# Patient Record
Sex: FEMALE | Race: WHITE | Employment: OTHER | RURAL
[De-identification: names, ages, dates, MRNs, and addresses within clinical notes are randomized per-mention and may not be internally consistent; named-entity substitution may affect disease eponyms.]

---

## 2017-01-26 ENCOUNTER — OFFICE VISIT (OUTPATIENT)
Dept: SURGERY | Age: 67
End: 2017-01-26

## 2017-01-26 VITALS
HEIGHT: 68 IN | HEART RATE: 85 BPM | BODY MASS INDEX: 35.16 KG/M2 | WEIGHT: 232 LBS | DIASTOLIC BLOOD PRESSURE: 83 MMHG | SYSTOLIC BLOOD PRESSURE: 143 MMHG

## 2017-01-26 DIAGNOSIS — S81.801S: Primary | ICD-10-CM

## 2017-01-26 NOTE — PROGRESS NOTES
[id number] [facility], [facility] [place]  Phone: [phone] Fax: [phone]                                              OFFICE NOTE    NAME: Maldonado Álvarez  : 1950    Chief Complaint:   Slow healing wound right leg    History: Maldonado Álvarez is a 74 y.o. female who sustained a significant injury to her right leg in October. She had significant skin loss at the time and has been treating the wound with wet to dry Dakin's. It is healing but very slowly about 1 cm a month. We feel that an acellular matrix application will help speed up the healing. Past Medical History   Diagnosis Date    Depression     IBS (irritable bowel syndrome)      Past Surgical History   Procedure Laterality Date    Hx knee replacement Right     Hx colonoscopy          Current Outpatient Prescriptions   Medication Sig Dispense Refill    sodium hypochlorite (DAKIN'S SOLUTION) external solution Apply to gauze and then cover wound daily. Indications: SKIN DISINFECTION 1 Bottle 3    silver sulfADIAZINE (SILVADENE) 1 % topical cream Apply  to affected area daily. 85 g 2     No Known Allergies  Social History     Social History    Marital status:      Spouse name: N/A    Number of children: N/A    Years of education: N/A     Occupational History    Not on file. Social History Main Topics    Smoking status: Never Smoker    Smokeless tobacco: Not on file    Alcohol use Yes    Drug use: Not on file    Sexual activity: Not on file     Other Topics Concern    Not on file     Social History Narrative     Family History   Problem Relation Age of Onset    Cancer Mother      breast       There is no problem list on file for this patient. Review of Systems:  Review of Systems   Constitutional: Negative for chills, fever, malaise/fatigue and weight loss. HENT: Negative for congestion, ear discharge, ear pain, hearing loss, nosebleeds, sore throat and tinnitus.     Eyes: Negative for blurred vision, double vision, pain, discharge and redness. Respiratory: Negative for cough, sputum production, shortness of breath and wheezing. Cardiovascular: Negative for chest pain, palpitations and leg swelling. Gastrointestinal: Positive for abdominal pain. Negative for blood in stool, constipation, diarrhea, heartburn, melena, nausea and vomiting. Genitourinary: Negative for frequency, hematuria and urgency. Musculoskeletal: Negative for back pain, joint pain and neck pain. Skin: Negative for itching and rash. Neurological: Negative for dizziness, tremors, focal weakness, seizures, weakness and headaches. Endo/Heme/Allergies: Does not bruise/bleed easily. Psychiatric/Behavioral: Positive for depression. Negative for memory loss. The patient is not nervous/anxious and does not have insomnia. Physical Exam:  Visit Vitals    /83 (BP 1 Location: Left arm, BP Patient Position: Sitting)    Pulse 85    Ht 5' 8\" (1.727 m)    Wt 232 lb (105.2 kg)    BMI 35.28 kg/m2       Physical Exam   Constitutional: She is oriented to person, place, and time. She appears well-developed and well-nourished. HENT:   Head: Normocephalic and atraumatic. Right Ear: External ear normal.   Left Ear: External ear normal.   Nose: Nose normal.   Mouth/Throat: Oropharynx is clear and moist. No oropharyngeal exudate. Eyes: Conjunctivae and EOM are normal. Pupils are equal, round, and reactive to light. Right eye exhibits no discharge. Left eye exhibits no discharge. No scleral icterus. Neck: Neck supple. No JVD present. No tracheal deviation present. No thyromegaly present. Cardiovascular: Normal rate, regular rhythm and normal heart sounds. Exam reveals no gallop and no friction rub. No murmur heard. Pulmonary/Chest: Effort normal and breath sounds normal. She has no wheezes. She has no rales. Abdominal: Soft. Bowel sounds are normal. She exhibits no distension and no mass. There is no tenderness. There is no rebound. Musculoskeletal: Normal range of motion. Right lateral leg with a healing, granulating wound:  4.3 x 3.7   A little leg edema noted   Lymphadenopathy:     She has no cervical adenopathy. Neurological: She is alert and oriented to person, place, and time. Skin: Skin is warm and dry. No rash noted. No erythema. Psychiatric: She has a normal mood and affect. Her behavior is normal. Judgment and thought content normal.       Impression:  Slow healing right leg wound; has healed 1.1 x 1.3 cm in one month  Options are STSG vs acellular matrix. Plan:  Placement of Acellular matrix under local with IV sedation on 1/79/53  Risks and complications were explained to patient and they voiced understanding.     Makenzie Parr M.D.

## 2017-02-23 ENCOUNTER — OFFICE VISIT (OUTPATIENT)
Dept: SURGERY | Age: 67
End: 2017-02-23

## 2017-02-23 VITALS
HEIGHT: 68 IN | HEART RATE: 80 BPM | SYSTOLIC BLOOD PRESSURE: 134 MMHG | DIASTOLIC BLOOD PRESSURE: 88 MMHG | BODY MASS INDEX: 35.16 KG/M2 | WEIGHT: 232 LBS

## 2017-02-23 DIAGNOSIS — S81.801S: Primary | ICD-10-CM

## 2017-02-23 NOTE — PROGRESS NOTES
No c/o    EXAM:  Adaptic a little dry but in place  Staples remoed and adaptic removed  Acell incorporated  Skin edges growing in  Redressed with acell matrix and adaptic    Change dresing every other day and apply solosite.     RTO 2 weeks

## 2017-03-09 ENCOUNTER — OFFICE VISIT (OUTPATIENT)
Dept: SURGERY | Age: 67
End: 2017-03-09

## 2017-03-09 VITALS
SYSTOLIC BLOOD PRESSURE: 116 MMHG | BODY MASS INDEX: 35.16 KG/M2 | DIASTOLIC BLOOD PRESSURE: 75 MMHG | WEIGHT: 232 LBS | HEART RATE: 86 BPM | HEIGHT: 68 IN

## 2017-03-09 DIAGNOSIS — S81.801S: ICD-10-CM

## 2017-03-09 DIAGNOSIS — L97.911 LEG ULCER, RIGHT, LIMITED TO BREAKDOWN OF SKIN (HCC): Primary | ICD-10-CM

## 2017-03-09 NOTE — PROGRESS NOTES
No c/o  Had telfa on area  Some slimy drainage on dressing    EXAM;  Ulcer clean with good granulating base:  13 x 10 mm  Bed prepared and new piece of acellular matrix applied. Covered with adaptic and solosite and then steri-stripped in place. The dressed with solosite and gauze and wrap.     RTO 2 weeks

## 2017-03-23 ENCOUNTER — OFFICE VISIT (OUTPATIENT)
Dept: SURGERY | Age: 67
End: 2017-03-23

## 2017-03-23 VITALS
WEIGHT: 232 LBS | DIASTOLIC BLOOD PRESSURE: 72 MMHG | HEIGHT: 68 IN | SYSTOLIC BLOOD PRESSURE: 112 MMHG | HEART RATE: 77 BPM | BODY MASS INDEX: 35.16 KG/M2

## 2017-03-23 DIAGNOSIS — S81.801S: Primary | ICD-10-CM

## 2017-04-06 ENCOUNTER — OFFICE VISIT (OUTPATIENT)
Dept: SURGERY | Age: 67
End: 2017-04-06

## 2017-04-06 VITALS
DIASTOLIC BLOOD PRESSURE: 90 MMHG | HEART RATE: 86 BPM | BODY MASS INDEX: 35.16 KG/M2 | HEIGHT: 68 IN | SYSTOLIC BLOOD PRESSURE: 150 MMHG | WEIGHT: 232 LBS

## 2017-04-06 DIAGNOSIS — S81.801S: Primary | ICD-10-CM

## 2017-04-06 NOTE — PROGRESS NOTES
No c/o  Still dressing it with solosite    EXAM:  Visit Vitals    /90 (BP 1 Location: Right arm, BP Patient Position: Sitting)    Pulse 86    Ht 5' 8\" (1.727 m)    Wt 105.2 kg (232 lb)    BMI 35.28 kg/m2       8 mm central thickness with 3 mm skin gap  Rest of scar better  Redressed with solosite and bandaid    RTO 2 months to check scarring

## 2017-06-13 ENCOUNTER — OFFICE VISIT (OUTPATIENT)
Dept: SURGERY | Age: 67
End: 2017-06-13

## 2017-06-13 VITALS
HEART RATE: 92 BPM | BODY MASS INDEX: 35.16 KG/M2 | HEIGHT: 68 IN | WEIGHT: 232 LBS | SYSTOLIC BLOOD PRESSURE: 107 MMHG | DIASTOLIC BLOOD PRESSURE: 67 MMHG

## 2017-06-13 DIAGNOSIS — S81.801S: Primary | ICD-10-CM

## 2017-06-13 NOTE — PROGRESS NOTES
No c/o    EXAM:  Anterior leg scar fading nicely  Lateral is healed but still dry, some drawing    Discussed scar care and timing on healing  Sunblock  Spent over half of 10 minutes in discussion

## 2021-06-09 ENCOUNTER — HOSPITAL ENCOUNTER (OUTPATIENT)
Dept: GENERAL RADIOLOGY | Age: 71
Discharge: HOME | End: 2021-06-09
Attending: NURSE PRACTITIONER
Payer: COMMERCIAL

## 2021-06-09 ENCOUNTER — HOSPITAL ENCOUNTER (OUTPATIENT)
Dept: MAMMOGRAPHY | Age: 71
Discharge: HOME | End: 2021-06-09
Attending: NURSE PRACTITIONER
Payer: COMMERCIAL

## 2021-06-09 DIAGNOSIS — Z78.0 ASYMPTOMATIC MENOPAUSAL STATE: ICD-10-CM

## 2021-06-09 DIAGNOSIS — Z12.31 VISIT FOR SCREENING MAMMOGRAM: ICD-10-CM

## 2021-06-09 PROCEDURE — 77080 DXA BONE DENSITY AXIAL: CPT

## 2021-06-09 PROCEDURE — 77067 SCR MAMMO BI INCL CAD: CPT

## 2022-04-28 NOTE — ADDENDUM NOTE
Addended by: Daphne Lutz on: 2/23/2017 01:29 PM     Modules accepted: Orders
Bed in lowest position, wheels locked, appropriate side rails in place/Call bell, personal items and telephone in reach/Instruct patient to call for assistance before getting out of bed or chair/Non-slip footwear when patient is out of bed/[place] to call system/Physically safe environment - no spills, clutter or unnecessary equipment/Purposeful Proactive Rounding/Room/bathroom lighting operational, light cord in reach

## 2022-08-29 ENCOUNTER — HOSPITAL ENCOUNTER (OUTPATIENT)
Dept: MAMMOGRAPHY | Age: 72
Discharge: HOME | End: 2022-08-29
Attending: INTERNAL MEDICINE
Payer: MEDICARE

## 2022-08-29 VITALS — BODY MASS INDEX: 35.58 KG/M2 | WEIGHT: 234 LBS

## 2022-08-29 DIAGNOSIS — Z12.31 VISIT FOR SCREENING MAMMOGRAM: ICD-10-CM

## 2022-08-29 PROCEDURE — 77063 BREAST TOMOSYNTHESIS BI: CPT

## 2023-04-19 ENCOUNTER — TRANSCRIBE ORDER (OUTPATIENT)
Dept: SCHEDULING | Age: 73
End: 2023-04-19

## 2023-05-23 ENCOUNTER — TRANSCRIBE ORDERS (OUTPATIENT)
Facility: HOSPITAL | Age: 73
End: 2023-05-23

## 2023-05-23 DIAGNOSIS — Z12.31 SCREENING MAMMOGRAM FOR BREAST CANCER: Primary | ICD-10-CM

## 2023-09-06 ENCOUNTER — HOSPITAL ENCOUNTER (OUTPATIENT)
Facility: HOSPITAL | Age: 73
Discharge: HOME | End: 2023-09-09
Payer: COMMERCIAL

## 2023-09-06 VITALS — BODY MASS INDEX: 31.71 KG/M2 | WEIGHT: 202 LBS | HEIGHT: 67 IN

## 2023-09-06 DIAGNOSIS — Z12.31 SCREENING MAMMOGRAM FOR BREAST CANCER: ICD-10-CM

## 2023-09-06 PROCEDURE — 77063 BREAST TOMOSYNTHESIS BI: CPT

## 2023-09-11 ENCOUNTER — TRANSCRIBE ORDERS (OUTPATIENT)
Facility: HOSPITAL | Age: 73
End: 2023-09-11

## 2023-09-11 ENCOUNTER — HOSPITAL ENCOUNTER (OUTPATIENT)
Facility: HOSPITAL | Age: 73
Discharge: HOME | End: 2023-09-14
Payer: COMMERCIAL

## 2023-09-11 DIAGNOSIS — R11.2 NAUSEA AND VOMITING, UNSPECIFIED VOMITING TYPE: ICD-10-CM

## 2023-09-11 DIAGNOSIS — R11.2 NAUSEA AND VOMITING, UNSPECIFIED VOMITING TYPE: Primary | ICD-10-CM

## 2023-09-11 PROCEDURE — 74022 RADEX COMPL AQT ABD SERIES: CPT

## 2024-09-27 ENCOUNTER — TRANSCRIBE ORDERS (OUTPATIENT)
Facility: HOSPITAL | Age: 74
End: 2024-09-27

## 2024-09-27 DIAGNOSIS — Z12.31 SCREENING MAMMOGRAM FOR BREAST CANCER: Primary | ICD-10-CM

## 2024-12-02 ENCOUNTER — HOSPITAL ENCOUNTER (OUTPATIENT)
Facility: HOSPITAL | Age: 74
Discharge: HOME | End: 2024-12-05
Payer: COMMERCIAL

## 2024-12-02 DIAGNOSIS — Z12.31 SCREENING MAMMOGRAM FOR BREAST CANCER: ICD-10-CM

## 2024-12-02 PROCEDURE — 77063 BREAST TOMOSYNTHESIS BI: CPT
